# Patient Record
Sex: MALE | Employment: FULL TIME | URBAN - METROPOLITAN AREA
[De-identification: names, ages, dates, MRNs, and addresses within clinical notes are randomized per-mention and may not be internally consistent; named-entity substitution may affect disease eponyms.]

---

## 2021-04-18 ENCOUNTER — HOSPITAL ENCOUNTER (EMERGENCY)
Facility: CLINIC | Age: 41
Discharge: HOME | End: 2021-04-18
Attending: EMERGENCY MEDICINE | Admitting: EMERGENCY MEDICINE

## 2021-04-18 ENCOUNTER — APPOINTMENT (OUTPATIENT)
Dept: GENERAL RADIOLOGY | Facility: CLINIC | Age: 41
End: 2021-04-18
Attending: EMERGENCY MEDICINE

## 2021-04-18 VITALS
WEIGHT: 180 LBS | RESPIRATION RATE: 21 BRPM | BODY MASS INDEX: 27.28 KG/M2 | HEART RATE: 63 BPM | HEIGHT: 68 IN | OXYGEN SATURATION: 97 % | DIASTOLIC BLOOD PRESSURE: 78 MMHG | SYSTOLIC BLOOD PRESSURE: 126 MMHG | TEMPERATURE: 96.3 F

## 2021-04-18 DIAGNOSIS — R07.89 ATYPICAL CHEST PAIN: ICD-10-CM

## 2021-04-18 DIAGNOSIS — R42 DIZZINESS: ICD-10-CM

## 2021-04-18 LAB
ALBUMIN SERPL-MCNC: 4.4 G/DL (ref 3.4–5)
ALP SERPL-CCNC: 86 U/L (ref 40–150)
ALT SERPL W P-5'-P-CCNC: 32 U/L (ref 0–70)
ANION GAP SERPL CALCULATED.3IONS-SCNC: 2 MMOL/L (ref 3–14)
AST SERPL W P-5'-P-CCNC: 18 U/L (ref 0–45)
BASOPHILS # BLD AUTO: 0 10E9/L (ref 0–0.2)
BASOPHILS NFR BLD AUTO: 0.5 %
BILIRUB SERPL-MCNC: 0.8 MG/DL (ref 0.2–1.3)
BUN SERPL-MCNC: 17 MG/DL (ref 7–30)
CALCIUM SERPL-MCNC: 9 MG/DL (ref 8.5–10.1)
CHLORIDE SERPL-SCNC: 105 MMOL/L (ref 94–109)
CO2 SERPL-SCNC: 31 MMOL/L (ref 20–32)
CREAT SERPL-MCNC: 1.11 MG/DL (ref 0.66–1.25)
D DIMER PPP FEU-MCNC: <0.3 UG/ML FEU (ref 0–0.5)
DIFFERENTIAL METHOD BLD: NORMAL
EOSINOPHIL # BLD AUTO: 0.4 10E9/L (ref 0–0.7)
EOSINOPHIL NFR BLD AUTO: 6.7 %
ERYTHROCYTE [DISTWIDTH] IN BLOOD BY AUTOMATED COUNT: 11.9 % (ref 10–15)
GFR SERPL CREATININE-BSD FRML MDRD: 82 ML/MIN/{1.73_M2}
GLUCOSE SERPL-MCNC: 132 MG/DL (ref 70–99)
HCT VFR BLD AUTO: 45.4 % (ref 40–53)
HGB BLD-MCNC: 15.2 G/DL (ref 13.3–17.7)
IMM GRANULOCYTES # BLD: 0 10E9/L (ref 0–0.4)
IMM GRANULOCYTES NFR BLD: 0.2 %
INTERPRETATION ECG - MUSE: NORMAL
LYMPHOCYTES # BLD AUTO: 1.2 10E9/L (ref 0.8–5.3)
LYMPHOCYTES NFR BLD AUTO: 19.7 %
MCH RBC QN AUTO: 28.7 PG (ref 26.5–33)
MCHC RBC AUTO-ENTMCNC: 33.5 G/DL (ref 31.5–36.5)
MCV RBC AUTO: 86 FL (ref 78–100)
MONOCYTES # BLD AUTO: 0.3 10E9/L (ref 0–1.3)
MONOCYTES NFR BLD AUTO: 4.2 %
NEUTROPHILS # BLD AUTO: 4.1 10E9/L (ref 1.6–8.3)
NEUTROPHILS NFR BLD AUTO: 68.7 %
NRBC # BLD AUTO: 0 10*3/UL
NRBC BLD AUTO-RTO: 0 /100
PLATELET # BLD AUTO: 230 10E9/L (ref 150–450)
POTASSIUM SERPL-SCNC: 3.7 MMOL/L (ref 3.4–5.3)
PROT SERPL-MCNC: 8.3 G/DL (ref 6.8–8.8)
RBC # BLD AUTO: 5.3 10E12/L (ref 4.4–5.9)
SODIUM SERPL-SCNC: 138 MMOL/L (ref 133–144)
TROPONIN I SERPL-MCNC: <0.015 UG/L (ref 0–0.04)
TROPONIN I SERPL-MCNC: <0.015 UG/L (ref 0–0.04)
WBC # BLD AUTO: 5.9 10E9/L (ref 4–11)

## 2021-04-18 PROCEDURE — 96374 THER/PROPH/DIAG INJ IV PUSH: CPT

## 2021-04-18 PROCEDURE — 80053 COMPREHEN METABOLIC PANEL: CPT | Performed by: EMERGENCY MEDICINE

## 2021-04-18 PROCEDURE — 93005 ELECTROCARDIOGRAM TRACING: CPT

## 2021-04-18 PROCEDURE — 85025 COMPLETE CBC W/AUTO DIFF WBC: CPT | Performed by: EMERGENCY MEDICINE

## 2021-04-18 PROCEDURE — 84484 ASSAY OF TROPONIN QUANT: CPT | Mod: 91 | Performed by: EMERGENCY MEDICINE

## 2021-04-18 PROCEDURE — 85379 FIBRIN DEGRADATION QUANT: CPT | Performed by: EMERGENCY MEDICINE

## 2021-04-18 PROCEDURE — 250N000011 HC RX IP 250 OP 636: Performed by: EMERGENCY MEDICINE

## 2021-04-18 PROCEDURE — 99285 EMERGENCY DEPT VISIT HI MDM: CPT | Mod: 25

## 2021-04-18 PROCEDURE — 71046 X-RAY EXAM CHEST 2 VIEWS: CPT

## 2021-04-18 RX ORDER — LORAZEPAM 0.5 MG/1
0.5 TABLET ORAL EVERY 6 HOURS PRN
Qty: 12 TABLET | Refills: 0 | Status: SHIPPED | OUTPATIENT
Start: 2021-04-18

## 2021-04-18 RX ORDER — IBUPROFEN 600 MG/1
600 TABLET, FILM COATED ORAL ONCE
Status: DISCONTINUED | OUTPATIENT
Start: 2021-04-18 | End: 2021-04-18 | Stop reason: HOSPADM

## 2021-04-18 RX ORDER — LORAZEPAM 2 MG/ML
1 INJECTION INTRAMUSCULAR ONCE
Status: COMPLETED | OUTPATIENT
Start: 2021-04-18 | End: 2021-04-18

## 2021-04-18 RX ADMIN — LORAZEPAM 1 MG: 2 INJECTION INTRAMUSCULAR; INTRAVENOUS at 14:08

## 2021-04-18 ASSESSMENT — MIFFLIN-ST. JEOR: SCORE: 1702.72

## 2021-04-18 ASSESSMENT — ENCOUNTER SYMPTOMS
LIGHT-HEADEDNESS: 1
NAUSEA: 1
DIAPHORESIS: 1

## 2021-04-18 NOTE — ED TRIAGE NOTES
Pt has had intermittent dizziness that started last night.  Pt states that he has had some chest pain as well.

## 2021-04-18 NOTE — ED PROVIDER NOTES
"  History   Chief Complaint:  Lightheadedness    The history is provided by the patient. A  was used ([place]. Acquaintance interpreted.).      Sami See is a 40 year old male with history of hypertension who presents with lightheadedness and chest pain. The patient reports having mid sternal chest pain and dizziness starting last night. He states that the dizziness got much worse this morning but is less intense here in the ED. He also endorses nausea here in the ED. He states laying on his side and taking deep breaths alleviates his chest pain. He denies family cardiac history. He did not take any pain medications but did take 45 mg Captopril this morning which was given to him by someone else.     Review of Systems   Constitutional: Positive for diaphoresis.   Cardiovascular: Positive for chest pain.   Gastrointestinal: Positive for nausea.   Neurological: Positive for light-headedness.   All other systems reviewed and are negative.    Allergies:  The patient has no known allergies.     Medications:  The patient is not currently taking any prescribed medications.    Past Medical History:    Hypertension    Social History:  The patient presents with an acquaintance  The patient works in construction.  The patient does not smoke marijuana or use recreational drugs.    Physical Exam     Patient Vitals for the past 24 hrs:   BP Temp Temp src Pulse Resp SpO2 Height Weight   04/18/21 1515 -- -- -- -- -- 97 % -- --   04/18/21 1500 126/78 -- -- -- -- -- -- --   04/18/21 1400 -- -- -- 63 21 98 % -- --   04/18/21 1328 (!) 143/84 96.3  F (35.7  C) Temporal 66 16 99 % 1.73 m (5' 8.11\") 81.6 kg (180 lb)       Physical Exam  Constitutional: Middle aged  male. Supine. No respiratory distress  HENT: No signs of trauma.   Eyes: EOM are normal. Pupils are equal, round, and reactive to light.   Neck: Normal range of motion. No JVD present. No cervical adenopathy.  Cardiovascular: Regular rhythm.  " Exam reveals no gallop and no friction rub.    No murmur heard. 2+ femoral and neck pulses.  Pulmonary/Chest: Bilateral breath sounds normal. No wheezes, rhonchi or rales.  Abdominal: Soft. No tenderness. No rebound or guarding.   Musculoskeletal: No edema. No tenderness.   Lymphadenopathy: No lymphadenopathy.   Neurological: Alert and oriented to person, place, and time. Normal strength. Coordination normal.   Skin: Skin is warm and dry. No rash noted. No erythema.      Emergency Department Course   ECG (13:32:02):  Rate 59 bpm. FL interval 144. QRS duration 100. QT/QTc 402/397. P-R-T axes 68 28 29. Sinus bradycardia Interpreted at 1337 by Arthur Dover MD.    Imaging:    X-ray Chest, 2 views:  There are no acute infiltrates. The cardiac silhouette is   not enlarged. Pulmonary vasculature is unremarkable.   Result per radiology.     Laboratory:    CBC: WBC: 5.9, HGB: 15.2, PLT: 230  CMP: Glucose 132, Anion Gap: 2, o/w WNL (Creatinine: 1.11)  Troponin (Collected 1406): <0.015  Troponin (Collected 1557): <0.015  D-dimer: <0.3    Emergency Department Course:    Reviewed:    I reviewed the patient's nursing notes, vitals, past medical records, Care Everywhere.     Assessments:    1348: I performed an exam of the patient and obtained history, as documented above.    1633: I rechecked the patient and updated them on findings. They are amenable to discharge.     Interventions:   1408: Ativan 1 mg IV    Disposition:  The patient was discharged to home.       Impression & Plan   Medical Decision Making:  This is a 40 year old presents to the Emergency Department complaining of dizziness, sweats, chest discomfort. This began yesterday and has persisted to today. He states that he has had one similar episode in the past. He has no cardiac risk factors. He has no headache, sore throat, cough, diarrhea, or urinary problems. Hie exam is non focal. His EKG and initial set of labs were unremarkable. Patient did seem  somewhat anxious and he received some Ativan and Advil which has helped him considerably. Chest X-ray and a second troponin are also normal. He denies any chest pain at this time. Given the dizziness, lightheadedness, and associated chest pain this could be an anxiety reaction however I will order an outpatient Stress ECHO to rule out any other cardiac issues. I have informed patient of this. I will also give him some Ativan and I told him not to use it while working or driving a car. He was referred to Dr. Brito and should follow up.     Diagnosis:    ICD-10-CM    1. Dizziness  R42 Echo Stress Echocardiogram   2. Atypical chest pain  R07.89 Echo Stress Echocardiogram       Discharge Medications:  Discharge Medication List as of 4/18/2021  4:38 PM      START taking these medications    Details   LORazepam (ATIVAN) 0.5 MG tablet Take 1 tablet (0.5 mg) by mouth every 6 hours as needed for anxiety, Disp-12 tablet, R-0, Local Print             Scribe Disclosure:  I, Fabricio Seymour, am serving as a scribe at 1:37 PM on 4/18/2021 to document services personally performed by Arthur Dover MD based on my observations and the provider's statements to me.          Arthur Dover MD  04/18/21 0675

## 2021-04-18 NOTE — ED NOTES
Bed: ED05  Expected date: 4/18/21  Expected time: 1:29 PM  Means of arrival:   Comments:  Triage GP

## 2021-04-28 ENCOUNTER — APPOINTMENT (OUTPATIENT)
Dept: INTERPRETER SERVICES | Facility: CLINIC | Age: 41
End: 2021-04-28